# Patient Record
Sex: FEMALE | Race: WHITE | NOT HISPANIC OR LATINO | URBAN - NONMETROPOLITAN AREA
[De-identification: names, ages, dates, MRNs, and addresses within clinical notes are randomized per-mention and may not be internally consistent; named-entity substitution may affect disease eponyms.]

---

## 2018-10-15 PROBLEM — H25.093: Noted: 2018-10-15

## 2018-10-15 PROBLEM — H52.03: Noted: 2018-10-15

## 2018-10-15 PROBLEM — H52.4: Noted: 2018-10-15

## 2019-10-17 ENCOUNTER — IMPORTED ENCOUNTER (OUTPATIENT)
Dept: URBAN - NONMETROPOLITAN AREA CLINIC 1 | Facility: CLINIC | Age: 64
End: 2019-10-17

## 2019-10-17 PROCEDURE — 92014 COMPRE OPH EXAM EST PT 1/>: CPT

## 2019-10-17 PROCEDURE — 92015 DETERMINE REFRACTIVE STATE: CPT

## 2019-10-17 NOTE — PATIENT DISCUSSION
Hypermetropia/Presbyopia- Discussed refractive status with pt today- MR done new GLRx given- Continue to monitor yearlyCataracts OU- Discussed findings with pt today- Signs/symptoms associated discussed- Not visually significant with no vision complaints at this time- Continue to monitor; 's Notes: MR 10/17/2019

## 2021-08-23 ENCOUNTER — IMPORTED ENCOUNTER (OUTPATIENT)
Dept: URBAN - NONMETROPOLITAN AREA CLINIC 1 | Facility: CLINIC | Age: 66
End: 2021-08-23

## 2021-08-23 PROBLEM — H52.4: Noted: 2021-08-23

## 2021-08-23 PROBLEM — H52.223: Noted: 2021-08-23

## 2021-08-23 PROBLEM — H25.13: Noted: 2021-08-23

## 2021-08-23 PROBLEM — H52.03: Noted: 2018-10-15

## 2021-08-23 PROCEDURE — 92014 COMPRE OPH EXAM EST PT 1/>: CPT

## 2021-08-23 PROCEDURE — 92015 DETERMINE REFRACTIVE STATE: CPT

## 2021-08-23 NOTE — PATIENT DISCUSSION
Compound Hyperopic Astigmatism OU w/Presbyopia-  discussed findings w/patient-  new spectacle Rx issued-  continue to monitor yearly or prnCataracts OU-  discussed findings w/patient-  no treatment indicated at this time-  UV protection recommended-  continue to monitor yearly or prn; 's Notes: MR 8/23/2021D 8/23/2021

## 2021-10-20 NOTE — PATIENT DISCUSSION
Glasses Rx given. [facility] Dermato-allergology Note  Office visit  Encounter Date: Oct 20, 2021  ____________________________________________    CC: No chief complaint on file.      HPI:  (10/19/21)  Ms. Viridiana Payan is a(n) 63 year old female who presents today as a return patient for allergy consultation  - Follow-up in Derm-Allergy clinic for 1st readings of patch tests after 2 days   - otherwise feeling well in usual state of health    Physical exam:  General: In no acute distress, well-developed, well-nourished  Eyes: no conjunctivitis  ENT: no signs of rhinitis   Pulmonary: no wheezing or coughing  Skin:Focused examination of the skin on test sites was performed = see test results below    Earlier History and Allergy exams:  - Pruritic rash March 2021, back, feet, hands d/t new polyester comforter per patient. ~3 days. Patient has photos of rash,   - Seasonal allergies for >30 years, remote history of chronic bronchitis   - Seen by Dr. Santo 6/1/2021 and negative blood test   - Not on any allergy treatments. Used Claritin and Flonase in the past >5 years. No longer using them. Start in late summer, end mid-fall. Runny nose and congestion coughing. Worse at night.   - Chronic onychomycosis - fingers and toenails. Notes many fungal infections. Cultures were negative per visit with Nisha. Took po fluconazole for 3 years that did not clear it + nail picking.   - otherwise feeling well in usual state of health  - No history of asthma. No atopic dermatitis. No family history.   - Use all free and clear products. Using pacifica and lancome makeup brands.     >> some seasonal Bronchitis and improvement on Claritine = 20 years ago sensitization to ragweed, dust, dogs    Skin: seen pictures in phone --> over the toes eczematous lesions dorsal to base and itchy, eczematous lesions on the back ==> worst in March    Past Medical History:   Patient Active Problem List   Diagnosis   (none) - all problems resolved  or deleted     Past Medical History:   Diagnosis Date     ADHD (attention deficit hyperactivity disorder)      Allergic rhinitis      Asthma      Chemical dependency (H)     age 18     Depressive disorder      Knee injury     Work comp     Migraine      Smoker 10/2007    1ppd     Suicide attempt (H)     2 times with pills     Uterine fibroids affecting pregnancy        Allergies:  Allergies   Allergen Reactions     Clindamycin Hives     Codeine Itching     Fluticasone Other (See Comments)     Nose bleeds     Latex Other (See Comments)     Skin burning      Penicillins      Propoxyphene      Sulfa Drugs        Medications:  Current Outpatient Medications   Medication     azelastine (ASTELIN) 0.1 % nasal spray     Clindamycin Phosphate 600 MG/4ML SOLN     Ginkgo Biloba Extract 40 MG CAPS     Iodine, Kelp, (KELP PO)     Multiple Vitamins-Minerals (HAIR VITAMINS PO)     Omega-3 Fatty Acids (FISH OIL PO)     triamcinolone (KENALOG) 0.1 % external ointment     TURMERIC PO     Current Facility-Administered Medications   Medication     ampicillin (OMNIPEN) 1 g vial INTRADERMAL     penicillin g potassium [id number] unit vial INTRADERMAL     sulfamethoxazole-trimethoprim (BACTRIM) injection 80 mg       Social History:  The patient works at home at a [employer] center. Patient has the following hobbies or non-occupational exposure: none.     Family History:  Family History   Problem Relation Age of Onset     Chronic Obstructive Pulmonary Disease Father      Allergies Father         Seasonal     Breast Cancer Mother      Skin Cancer Brother      Rheumatoid Arthritis Maternal Aunt      Heart Disease Paternal Uncle      Bone Cancer Paternal Grandfather        Previous Labs, Allergy Tests, Dermatopathology, Imaging:      Referred By: Karsten Santo MD  [address] [place] [id number]     Allergy Tests:    Past Allergy Test    Order for Future Allergy Testing:    [x] Outpatient  [] Inpatient: [place]..../ Bed ....       Skin Atopy (atopic  dermatitis) [] Yes   [x] No .........  Contact allergies:   [] Yes   [x] No ..........  Hand eczema:   [] Yes   [] No           Leading hand:   [] R   [] L       [] Ambidextrous         Drug allergies:        [] Yes   [x] No  Which?.Hives to Penicillins and Clindamycin    Urticaria/Angioedema  [] Yes   [x] No .........  Food Allergy:  [] Yes   [x] No  which?......  Pets :  [x] Yes   [] No  Which?..[place] dog         [x]  Rhinitis   [x] Conjunctivitis   [] Sinusitis   [] Polyposis   [] Otitis   [] Pharyngitis         []  none  Operations:   [] Tonsils   [] Septum   [] Sinus   [] Polyposis        [x] Asthma bronchiale/Bronchitis   [] Coughing      []  none  Symptoms (mostly Rhinoconjunctivitis and Asthma) aggravated by:  Season   [] I   [] II   [] III   [] IV   []V   []VI   []VII   []VIII   [x]IX   [x]X   []XI   []XI     [x] perennial Bronchitis  Day time      [] morning   [] noon      [] evening        [] night    [] whole day........  []  none  Location/changes    [] inside        [] outside   [] mountains    [] sea     [] others.............   []  none  Triggers, specific     [] animals     [] plants     [] dust              [] others ...........................    []  none  Triggers, others       [] work          [] psyche    [] sport            [] others .............................  []  none  Irritant                [] phys efforts [] smoke    [] heat/cold     [] odors  []others............... []  none    Order for PATCH TESTS  Reason for tests (suspected allergy): recurrent dermatitis  Known previous allergies: none  Standardized panels  [x] Standard panel (40 tests)  [x] Preservatives & Antimicrobials (31 tests)  [x] Emulsifiers & Additives (25 tests)   [x] Perfumes/Flavours & Plants (25 tests)  [] Hairdresser panel (12 tests)  [x] Rubber Chemicals (22 tests)  [x] Plastics (26 tests)  [] Colorants/Dyes/Food additives (20 tests)  [] Metals (implants/dental) (24 tests)  [] Local anaesthetics/NSAIDs (13  tests)  [x] Antibiotics & Antimycotics (14 tests)   [] Corticosteroids (15 tests)   [] Photopatch test (62 tests)   [] others: ...      [] Patient's own products: ...    DO NOT test if chemical or biological identity is unknown!     always ask from patient the product information and safety sheets (MSDS)       Order for PRICK TESTS    Reason for tests (suspected allergy): seasonal and probably perennial Rhinitis and Bronchitis  Known previous allergies: HDM and ragweed    Standardized prick panels  [x] Atopic panel (20 tests)  [] Pediatric Panel (12 tests)  [] Milk, Meat, Eggs, Grains (20 tests)   [] Dust, Epithelia, Feathers (10 tests)  [] Fish, Seafood, Shellfish (17 tests)  [] Nuts, Beans (14 tests)  [] Spice, Vegetable, Fruit (17 tests)  [] Pollen Panel = Tree, Grass, Weed (24 tests)  [] Others: ...      [] Patient's own products: ...    DO NOT test if chemical or biological identity is unknown!     always ask from patient the product information and safety sheets (MSDS)     Standardized intradermal tests  [x] Penicillium notatum [x] Aspergillus fumigatus [x] House dust mites D.far & D. pteron  [] Cat    [x] dog  [] Others: ...  [] Bee venom   [] Wasp venom  !!Specific protocol with dilutions!!       Order for Drug allergy tests (prick & Intradermal &  patch tests)    [x] Penicillin G  [x] Ampicillin [] Cefazolin   [] Ceftriaxone   [] Ceftazidime  [x] Bactrim    [x] Others: Clindamycine  Order for ... as test date      RESULTS & EVALUATION of PATCH TESTS    Patch test readings after     [x] 2 days, [] 3 days [x] 4 days, [] 5 days,   Other duration: ...    10/18/21 application of patch tests with results:    STANDARD Series        # Substance 2 days 4 days remarks    1 Fahad Mix [C] - -     2 Colophony - -     3  2-Mercaptobenzothiazole  - -      4 Methylisothiazolinone - -     5 Carba Mix - -     6 Thiuram Mix [A] - -     7 Bisphenol A Epoxy Resin - -     8 X-Hkco-Iaodygfaibz-Formaldehyde Resin NA NA     9  Mercapto Mix [A] - -     10 Black Rubber Mix- PPD [B] - -     11 Potassium Dichromate  -  -     12 Balsam of [place] (Myroxylon Pereirae Resin) - -     13 Nickel Sulphate Hexahydrate - -     14 Mixed Dialkyl Thiourea - -     15 Paraben Mix [B] - -     16 Methyldibromo Glutaronitrile - -     17 Fragrance Mix - -     18 2-Bromo-2-Nitropropane-1,3-Diol (Bronopol) CT - -     19 Lyral - -     20 Tixocortol-21- Pivalate CT - -     21 Diazolidiyl Urea (Germall II) - -     22 Methyl Methacrylate - -     23 Cobalt (II) Chloride Hexahydrate - -     24 Fragrance Mix II  - -     25 Compositae Mix - -     26 Benzoyl Peroxide - -     27 Bacitracin - -     28 Formaldehyde - -     29 Methylchloroisothiazolinone / Methylisothiazolinone - -     30 Corticosteroid Mix CT NA NA     31 Sodium Lauryl Sulfate - -     32 Lanolin Alcohol - -     33 Turpentine - -     34 Cetylstearylalcohol - -     35 Chlorhexidine Dicluconate + - Or pustule    36 Budenoside NA NA     37 Imidazolidinyl Urea  NA NA     38 Ethyl-2 Cyanoacrylate NA NA     39 Quaternium 15 (Dowicil 200) - -     40 Decyl Glucoside - -     PRESERVATIVES & ANTIMICROBIALS        # Substance 2 days 4 days remarks   41 1  1,2-Benzisothiazoline-3-One, Sodium Salt - -    42 2  1,3,5-Alok (2-Hydroxyethyl) - Hexahydrotriazine (Grotan BK) - -    43 3 9-Bodgsuzchxxaz-9-Nitro-1, 3-Propanediol NA NA    44 4  3, 4, 4' - Triclocarban - -    45 5 4 - Chloro - 3 - Cresol - -    46 6 4 - Chloro - 4 - Xylenol (PCMX) - -    47 7 7-Ethylbicyclooxazolidine (Bioban BC3111) - -    48 8 Benzalkonium Chloride CT - -    49 9 Benzyl Alcohol - -    50 10 Cetalkonium Chloride - -    51 11 Cetylpyrimidine Chloride  - -    52 12 Chloroacetamide - -    53 13 DMDM Hydantoin - -    54 14 Glutaraldehyde - -    55 15 Triclosan - -    56 16 Glyoxal Trimeric Dihydrate - -    57 17 Iodopropynyl Butylcarbamate - -    58 18 Octylisothiazoline NA NA    59 19 Bithionol CT - -    60 20 Bioban P 1487 (Nitrobutyl)  Morpholine/(Ethylnitro-Trimethylene) Dimorpholine +/++ -    61 21 Phenoxyethanol NA NA    62 22 Phenyl Salicylate - -    63 23 Povidone Iodine - -    64 24 Sodium Benzoate - -    65 25 Sodium Disulfite NA NA    66 26 Sorbic Acid - -    67 27 Thimerosal      68 28 Melamine Formaldehyde Resin      69 29 Ethylenediamine Dihydrochloride        Parabens      70 30 Butyl-P-Hydroxybenzoate NA NA    71 31 Ethyl-P-Hydroxybenzoate - -    72 32 Methyl-P-Hydroxybenzoate - -    73 33 Propyl-P-Hydroxybenzoate - -     EMULSIFIERS & ADDITIVES       # Substance 2 days 4 days remarks   74 1 Polyethylene Glycol-400 - -    75 2 Cocamidopropyl Betaine - -    76 3 Amerchol L101 - -    77 4 Propylene Glycol - -    78 5 Triethanolamine - -    79 6 Sorbitane Sesquiolate CT - -    80 7 Isopropylmyristate - -    81 8 Polysorbate 80 CT - -    82 9 Amidoamine   (Stearamidopropyl Dimethylamine) - -    83 10 Oleamidopropyl Dimethylamine - -    84 11 Lauryl Glucoside NA NA    85 12 Coconut Diethanolamide  - -    86 13 2-Hydroxy-4-Methoxy Benzophenone (Oxybenzone) - -    87 14 Benzophenone-4 (Sulisobenzon) NA NA    88 15 Propolis - -    89 16 Dexpanthenol NA NA    90 17 Carboxymethyl Cellulose Sodium NA NA    91 18 Abitol - -    92 19 Tert-Butylhydroquinone - -    93 20 Benzyl Salicylate - -    94 21 Dimethylaminopropylamin (DMPA) CT? D053      95 22 Zinc Pyrithione (Zinc Omadine) CT? Z006      96 23 Alok(Hydroxymethyl) Nitromethane CT        Antioxidant - -    97 24 Dodecyl Gallate - -    98 25 Butylhydroxyanisole (BHA) - -    99 26 Butylhydroxytoluene (BHT) - -    100 27 Di-Alpha-Tocopherol (Vit E) - -    101 28 Propyl Gallate - -     PERFUMES, FLAVORS & PLANTS        # Substance 2 days 4 days remarks   102 1 Benzyl Cinnamate NA NA    103 2 Di-Limonene (Dipentene) - -    104 3 Cananga Odorata (Ylang Ylang) (I) - -    105 4 Lichen Acid Mix - -    106 5 Mentha Piperita Oil (Peppermint Oil) - -    107 6 Sesquiterpenelactone mix - -    108 7 Tea Tree  Oil, Oxidized - -    109 8 Wood Tar Mix + -    110 9 Abietic Acid - -    111 10 Lavendula Angustifolia Oil (Lavender Oil) - -    112 11 Fragrance mix II CT * - -      Fragrance Mix I      113 12 Oakmoss Absolute + -    114 13 Eugenol - -    115 14 Geraniol - -    116 15 Hydroxycitronellal - -    117 16 Isoeugenol - -    118 17 Cinnamic Aldehyde - -    119 18 Cinnamic Alcohol  - -      Fragrance mix II      120 19 Citronellol - -    121 20 Alpha-Hexylcinnamic Aldehyde    - -    122 21 Citral NA NA    123 22 Farnesol - -    124 23 Coumarin - -      Hexylcinnamic aldehyde, Coumarin, Farnesol, Hydroxyisohexy3-cyclohexene carboxaldehyde, citral, citrolellol   RUBBER CHEMICALS        # Substance 2 days 4 days remarks     Carbamate      125 1 Zinc Bis ( Diethyldithio carbamate ) (ZDEC) - -     2 Zinc Bis (Dibutyldithiocarbamate) - -     3 1,3-Diphenylguanidine (DPG) - -      Thiourea       4 Dibutylthiourea - -     5 Diphenyltiourea - -    130 6 Thiourea NA NA      Mercapto chemicals       7 Morpholinyl Mercaptobenzothiazole - -     8 L-Yeifgflnpb-7-Benzothiazyl-Sulfenamide - -     9 Dibenzothiazyl Disulfide - -      Thiuram chemicals       10 Dipentamethylenethiuram Disulfide - -    135 11 Tetraethylthiuram Disulfide (Disulfiram) - -     12 Tetramethylthiuram Disulfide - -     13 Tetramethyl Thiuram Monosulfide (TMTM) - -      4-Phenylenediamine derivatives       14 N-Isopropyl-N'-Phenyl-P-Phenylenediamine (IPPD) - -     15 Nsjphoka-F-Rrdkeqsjfeppedkg (DPPD) - -      Various Rubber Additives      140 16 Hydroquinone Monobenzylether  - -     17 Hexamethylenetetramine - -     18 4,4'-Dihydroxybiphenyl - -     19 Cyclohexylthiophtalimide - -     20 N-Phenyl-B-Naphthylamine - -    145 21 Dodecyl Mercaptan - -      PLASTICS        # Substance 2 days 4 days remarks     Acrylates - -    146 1 2-Hydroxyethyl Methacrylate (HEMA) +++ -     2 1,4-Butandioldimethacrylate (BUDMA) - -     3  2-Ethylhexyl Acrylate - -     4  Bisphenol-A-Dimethacrylate  - -    150 5 Diurethane-Dimethacrylate NA NA     6 Ethyleneglycoldimethacrylate (EGDMA) +++ -     7 Pentaerythritoltriacrylate (TYLER) - -     8 Triethylene Glycol Dimethacrylate (TEGDMA) + -      Synthetic material/additives        9 G-Urqs-Wpgczfewvjz - -    155 10 Tricresyl Phosphate NA NA     11 0-Exmu-Cgyeajairoavo - -     12 Bis (2-Ethylhexyl) Phthalate - -     13 Dibutylphthalate - -     14 Dimethylphthalate - -    160 15 Toluene-2,4-Diisocyanate NA NA     16 Diphenylmethane-4,4''-Diisocyanate - -      EPOXY RESIN SYSTEMS        Reactive Solvents - -     17 Cresyl Glycidyl Ether - -     18 Butyl Glycidyl Ether - -     19 Phenyl Glycidyl Ether - -    165 20 1,4-Butanediol Diglycidyl Ether - -     21 1,6-Hexanediole Diglycidyl Ether - -      Hardener / Accelerator - -     22 Triethylenetetramine - -     23 Diethylenetriamine - -     24 Isophorone Diamine (IPD) - -    170 25 N,N-Dimethyl-P-Toluidine - -     26 3-pguf-Njpnyipcopzdn (antioxidant/stabilizer) CT - -    172 27 2-nyno-Ydimrcggjvhocussobndrly resin PTBP CT  - -    ANTIBIOTICS & ANTIMYCOTICS    # Substance 2 days 4 days remarks   173 1 Erythromycine - -     2 Framycetine Sulphate - -    175 3 Fusidic Acid Sodium Salt - -     4 Gentamicin Sulphate - -     5 Neomycine Sulphate - -     6 Oxytetracycline  - -     7 Polymyxin B Sulphate - -    180 8 Tetracycline-HCL - -     9 Sulfanilamide - -     10 Metronidazole - -     11 Oxyquinoline Mix - -     12 Nitrofurazone - -    185 13 Nystatin - -    186 14 Clotrimazole - -      Results of patch tests:                         Interpretation:  - Negative                    A    = Allergic      (+) Erythema    TI   = Toxic/irritant   + E + Infiltration    RaP = Relevance at Present     ++ E/I + Papulovesicle   Rpr  = Relevance Previously     +++ E/I/P + Blister     nR   = No Relevance    DRUG ALLERGY TEST SERIES 10/20/2021        Prick Tests         Substance/ Allergen Conc Result (20  min) Remarks    Histamine Hydrochloride (ALK) 0.1 mg/ml +     NaCl 0.9% -     Benzylpenicillin (Penicillin G) 1 Roc IU/ml (600 mg) -     Ampicillin 100mg/ml -     Clindamycin 150 mg/ml -     Bactrim 80/400 mg/ml -       Intradermal Tests   immed immed delay delay      Substance Conc 1st dil  2nd dil  days  days remarks    NaCl 0.9%         Benzylpenicillin (Penicillin G) 1:100 -        Ampicillin 1:10 -        Clindamycin 1:10 -        Bactrim 1:100 -           Patch Tests  as is as is 1:2 1:2     As Is  Vas Substance Conc days days days days remarks     Benzylpenicillin (Penicillin G) 1 [place] IU/ml (600 mg)          Ampicillin 100 mg/ml          Clindamycin 150 mg/ml          Bactrim 80/400 mg/ml            Atopy Screen (Placed 10/18/21)    No Substance Readings (15 min) Evaluation   POS Histamine 1mg/ml ++    NEG NaCl 0.9% -      No Substance Readings (15 min) Evaluation   1 Alternaria alternata (tenuis)  -    2 Cladosporium herbarum -    3 Aspergillus fumigatus -    4 Penicillium notatum -    5 Dermatophagoides pteronyssinus -    6 Dermatophagoides farinae -    7 Dog epithelium (canis spp) -    8 Cat hair (mustapha catus) -    9 Cockroach   (Blatella americana & germanica) -    10 Grass mix midwest   (Carina, Orchard, Redtop, Rusty) -    11 Dez grass (sorghum halepense) -    12 Weed mix   (common Cocklebur, Lamb s quarters, rough redroot Pigweed, Dock/Sorrel) -    13 Mug wort (artemisia vulgare) -    14 Ragweed giant/short (ambrosia spp) -    15 White birch (Betula papyrifera) -    16 Tree mix 1 (Pecan, Maple BHR, Oak RVW, american [place], black [place]) -    17 Red cedar (juniperus virginia) -    18 Tree mix 2   (white Liang, river/red Birch, black Reinbeck, common [place], american Elm) -    19 Box elder/Maple mix (acer spp) -    20 Bureau shagbark (carya ovata) -           Conclusion: no signs for atopy in prick tests      Intradermal Testing (Placed 10/18/21)    No Substance Conc.  Reading (15min)  immediate  Papule [mm] / Erythema [mm] Reading   (... days)  delayed Papule [mm] / Erythema [mm] Remarks   DF Standard Dust Mite - D. Farinae 1:10 -   -    DP Standard Dust Mite - D. Pteronyssinus 1:10 + 5/5  -    A Aspergillus fumigatus  1:10 -   -    P Penicillium notatum 1:10 -   -     Dog epithelium 1:10 +/++ 8/9  -    Conclusions: in intradermal tests some immediate type reaction to the dog epithelium and less to one of the house dust mites. We will read on Wednesday if delayed reaction    [] No relevant allergic reaction observed    [x] Allergic reaction diagnosed against following allergens:   Acrylates: +++ 2-Hydroxyethyl Methacrylate (HEMA), +++ Ethyleneglycoldimethacrylate (EGDMA), + Triethylene Glycol Dimethacrylate (TEGDMA)  Preservatives/disinfectants: + Chlorhexidine Dicluconate, +/++ Bioban P 1487 (Nitrobutyl) Morpholine/(Ethylnitro-Trimethylene) Dimorpholine   Fragrances: + Wood Tar Mix, + Oakmoss abosolute      Interpretation/ remarks:   See later    [] Patient information given   [] ACDS CAMP information's (# ....) to following compounds: .....   [] General information's to following compounds: ......      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) DDx Lichen planus or allergic contact dermatitis  * chronic illness with exacerbation, progression, side effects from treatment     # unclear allergy to multiple drugs (Penicillin G, Sulfonamides, Clinamycine)    No signs for immediate type  reaction to these antibiotics  * chronic illness with exacerbation, progression, side effects from treatment    >> explained to patient that there is no direct contra-indication against the COVID vaccines, incl PEG      These conclusions are made at the best of one's knowledge and belief based on the provided evidence such as patient's history and allergy test results and they can change over time or can be incomplete because of missing information's.    ==> Treatment Plan:  Plan allergy tests  Patient should do COVID vaccine ASAP     ___________________________      Procedures Performed: Allergy tests, including drug allergy tests    Staff: : provider      Follow-up in Derm-Allergy clinic for 2nd readings and final conclusions after 4 days (in person)   ___________________________      I spent a total of 28 minutes with Viridiana Payan during today s  visit. This time was spent discussing all the individual test results, correlating them to the clinical relevance, counseling the patient and/or coordinating care and performing allergy tests or procedures.

## 2022-04-15 ASSESSMENT — TONOMETRY
OD_IOP_MMHG: 15
OS_IOP_MMHG: 16
OD_IOP_MMHG: 14
OS_IOP_MMHG: 16

## 2022-04-15 ASSESSMENT — VISUAL ACUITY
OD_SC: 20/20
OS_SC: 20/20
OD_SC: 20/25+2
OU_SC: 20/20
OS_SC: 20/20
OU_CC: 20/20